# Patient Record
Sex: FEMALE | Race: BLACK OR AFRICAN AMERICAN | Employment: FULL TIME | URBAN - METROPOLITAN AREA
[De-identification: names, ages, dates, MRNs, and addresses within clinical notes are randomized per-mention and may not be internally consistent; named-entity substitution may affect disease eponyms.]

---

## 2018-08-04 ENCOUNTER — HOSPITAL ENCOUNTER (EMERGENCY)
Age: 28
Discharge: HOME HEALTH | End: 2018-08-04
Attending: EMERGENCY MEDICINE
Payer: COMMERCIAL

## 2018-08-04 VITALS
TEMPERATURE: 97.7 F | BODY MASS INDEX: 20.09 KG/M2 | WEIGHT: 128 LBS | RESPIRATION RATE: 18 BRPM | OXYGEN SATURATION: 100 % | SYSTOLIC BLOOD PRESSURE: 121 MMHG | HEART RATE: 77 BPM | DIASTOLIC BLOOD PRESSURE: 72 MMHG | HEIGHT: 67 IN

## 2018-08-04 DIAGNOSIS — R11.15 NON-INTRACTABLE CYCLICAL VOMITING WITH NAUSEA: Primary | ICD-10-CM

## 2018-08-04 LAB
ANION GAP SERPL CALC-SCNC: 13 MMOL/L (ref 3–18)
BASOPHILS # BLD: 0 K/UL (ref 0–0.06)
BASOPHILS NFR BLD: 0 % (ref 0–3)
BUN SERPL-MCNC: 12 MG/DL (ref 7–18)
BUN/CREAT SERPL: 14 (ref 12–20)
CALCIUM SERPL-MCNC: 8.9 MG/DL (ref 8.5–10.1)
CHLORIDE SERPL-SCNC: 106 MMOL/L (ref 100–108)
CO2 SERPL-SCNC: 24 MMOL/L (ref 21–32)
CREAT SERPL-MCNC: 0.86 MG/DL (ref 0.6–1.3)
DIFFERENTIAL METHOD BLD: ABNORMAL
EOSINOPHIL # BLD: 0.2 K/UL (ref 0–0.4)
EOSINOPHIL NFR BLD: 1 % (ref 0–5)
ERYTHROCYTE [DISTWIDTH] IN BLOOD BY AUTOMATED COUNT: 13 % (ref 11.6–14.5)
GLUCOSE SERPL-MCNC: 99 MG/DL (ref 74–99)
HCT VFR BLD AUTO: 40.9 % (ref 35–45)
HGB BLD-MCNC: 13.7 G/DL (ref 12–16)
LYMPHOCYTES # BLD: 2.3 K/UL (ref 0.8–3.5)
LYMPHOCYTES NFR BLD: 13 % (ref 20–51)
MCH RBC QN AUTO: 30.5 PG (ref 24–34)
MCHC RBC AUTO-ENTMCNC: 33.5 G/DL (ref 31–37)
MCV RBC AUTO: 91.1 FL (ref 74–97)
MONOCYTES # BLD: 0.4 K/UL (ref 0–1)
MONOCYTES NFR BLD: 2 % (ref 2–9)
NEUTS SEG # BLD: 14.7 K/UL (ref 1.8–8)
NEUTS SEG NFR BLD: 84 % (ref 42–75)
PLATELET # BLD AUTO: 312 K/UL (ref 135–420)
PLATELET COMMENTS,PCOM: ABNORMAL
PMV BLD AUTO: 11.1 FL (ref 9.2–11.8)
POTASSIUM SERPL-SCNC: 3.8 MMOL/L (ref 3.5–5.5)
RBC # BLD AUTO: 4.49 M/UL (ref 4.2–5.3)
RBC MORPH BLD: ABNORMAL
SODIUM SERPL-SCNC: 143 MMOL/L (ref 136–145)
WBC # BLD AUTO: 17.6 K/UL (ref 4.6–13.2)

## 2018-08-04 PROCEDURE — 96374 THER/PROPH/DIAG INJ IV PUSH: CPT

## 2018-08-04 PROCEDURE — 80048 BASIC METABOLIC PNL TOTAL CA: CPT | Performed by: EMERGENCY MEDICINE

## 2018-08-04 PROCEDURE — 74011250636 HC RX REV CODE- 250/636: Performed by: EMERGENCY MEDICINE

## 2018-08-04 PROCEDURE — 85025 COMPLETE CBC W/AUTO DIFF WBC: CPT | Performed by: EMERGENCY MEDICINE

## 2018-08-04 PROCEDURE — 99282 EMERGENCY DEPT VISIT SF MDM: CPT

## 2018-08-04 PROCEDURE — 96361 HYDRATE IV INFUSION ADD-ON: CPT

## 2018-08-04 RX ORDER — ONDANSETRON 2 MG/ML
4 INJECTION INTRAMUSCULAR; INTRAVENOUS
Status: COMPLETED | OUTPATIENT
Start: 2018-08-04 | End: 2018-08-04

## 2018-08-04 RX ORDER — ONDANSETRON 8 MG/1
8 TABLET, ORALLY DISINTEGRATING ORAL
Qty: 10 TAB | Refills: 0 | Status: SHIPPED | OUTPATIENT
Start: 2018-08-04

## 2018-08-04 RX ADMIN — ONDANSETRON 4 MG: 2 INJECTION INTRAMUSCULAR; INTRAVENOUS at 07:28

## 2018-08-04 RX ADMIN — SODIUM CHLORIDE 1000 ML: 900 INJECTION, SOLUTION INTRAVENOUS at 07:28

## 2018-08-04 NOTE — ED PROVIDER NOTES
EMERGENCY DEPARTMENT HISTORY AND PHYSICAL EXAM 
 
7:18 AM 
 
 
Date: 8/4/2018 Patient Name: Lamont Cunningham History of Presenting Illness Chief Complaint Patient presents with  Vomiting History Provided By: Patient Chief Complaint: Vomiting Duration:  10 hours Timing:  Acute Location: Gastrointestinal 
Quality: \"Dry heaving\" Severity: Moderate Modifying Factors: No modifying or aggravating factors were reported. Associated Symptoms: nausea Additional History (Context): Lamont Cunningham is a 33 y.o. female with No significant past medical history who presents with moderate acute vomiting with an onset of 10 hours ago. She says she's vomited so much that now she's only dry heaving. Associated symptoms include nausea. Patient says she hasn't been around anyone sick or taking medication. Denies diarrhea and fever. No modifying or aggravating factors were reported. PCP: None Past History Past Medical History: 
History reviewed. No pertinent past medical history. Past Surgical History: 
History reviewed. No pertinent surgical history. Family History: 
Family History Problem Relation Age of Onset  Cancer Neg Hx  Diabetes Neg Hx   
 Heart Disease Neg Hx  Hypertension Neg Hx  Stroke Neg Hx Social History: 
Social History Substance Use Topics  Smoking status: Never Smoker  Smokeless tobacco: Never Used  Alcohol use No  
 
 
Allergies: 
No Known Allergies Review of Systems Review of Systems Constitutional: Negative for fever. HENT: Negative for congestion. Respiratory: Negative for cough and shortness of breath. Cardiovascular: Negative for chest pain. Gastrointestinal: Positive for nausea and vomiting. Negative for abdominal pain and diarrhea. Musculoskeletal: Negative for back pain. Skin: Negative for rash. Neurological: Negative for light-headedness.   
All other systems reviewed and are negative. Physical Exam  
 
Visit Vitals  /86 (BP 1 Location: Left arm, BP Patient Position: At rest)  Pulse 77  Temp 97.7 °F (36.5 °C)  Resp 18  Ht 5' 7\" (1.702 m)  Wt 58.1 kg (128 lb)  LMP 07/30/2018  SpO2 100%  BMI 20.05 kg/m2 Physical Exam  
Constitutional: She is oriented to person, place, and time. She appears well-developed. Alert and oriented with moderately ill appearance. HENT:  
Head: Normocephalic. Mouth/Throat: Oropharynx is clear and moist.  
Eyes: Pupils are equal, round, and reactive to light. Neck: Normal range of motion. Cardiovascular: Normal rate and normal heart sounds. No murmur heard. Pulmonary/Chest: Effort normal. She has no wheezes. She has no rales. Abdominal: Soft. There is no tenderness. Musculoskeletal: Normal range of motion. She exhibits no edema. Neurological: She is alert and oriented to person, place, and time. Skin: Skin is warm and dry. Nursing note and vitals reviewed. Diagnostic Study Results Labs - Recent Results (from the past 12 hour(s)) METABOLIC PANEL, BASIC Collection Time: 08/04/18  7:33 AM  
Result Value Ref Range Sodium 143 136 - 145 mmol/L Potassium 3.8 3.5 - 5.5 mmol/L Chloride 106 100 - 108 mmol/L  
 CO2 24 21 - 32 mmol/L Anion gap 13 3.0 - 18 mmol/L Glucose 99 74 - 99 mg/dL BUN 12 7.0 - 18 MG/DL Creatinine 0.86 0.6 - 1.3 MG/DL  
 BUN/Creatinine ratio 14 12 - 20 GFR est AA >60 >60 ml/min/1.73m2 GFR est non-AA >60 >60 ml/min/1.73m2 Calcium 8.9 8.5 - 10.1 MG/DL  
CBC WITH AUTOMATED DIFF Collection Time: 08/04/18  7:33 AM  
Result Value Ref Range WBC 17.6 (H) 4.6 - 13.2 K/uL  
 RBC 4.49 4.20 - 5.30 M/uL  
 HGB 13.7 12.0 - 16.0 g/dL HCT 40.9 35.0 - 45.0 % MCV 91.1 74.0 - 97.0 FL  
 MCH 30.5 24.0 - 34.0 PG  
 MCHC 33.5 31.0 - 37.0 g/dL  
 RDW 13.0 11.6 - 14.5 % PLATELET 347 929 - 865 K/uL  MPV 11.1 9.2 - 11.8 FL  
 NEUTROPHILS 84 (H) 42 - 75 % LYMPHOCYTES 13 (L) 20 - 51 % MONOCYTES 2 2 - 9 % EOSINOPHILS 1 0 - 5 % BASOPHILS 0 0 - 3 %  
 ABS. NEUTROPHILS 14.7 (H) 1.8 - 8.0 K/UL  
 ABS. LYMPHOCYTES 2.3 0.8 - 3.5 K/UL  
 ABS. MONOCYTES 0.4 0 - 1.0 K/UL  
 ABS. EOSINOPHILS 0.2 0.0 - 0.4 K/UL  
 ABS. BASOPHILS 0.0 0.0 - 0.06 K/UL  
 DF MANUAL PLATELET COMMENTS ADEQUATE PLATELETS    
 RBC COMMENTS NORMOCYTIC, NORMOCHROMIC Radiologic Studies - No orders to display Medical Decision Making I am the first provider for this patient. I reviewed the vital signs, available nursing notes, past medical history, past surgical history, family history and social history. Vital Signs-Reviewed the patient's vital signs. Pulse Oximetry Analysis -  100% on room air (Normal) Records Reviewed: Nursing Notes (Time of Review: 7:18 AM) Diagnosis Clinical Impression: 1. Non-intractable cyclical vomiting with nausea Disposition: Discharge Follow-up Information Follow up With Details Comments Contact Info Via [employer] in 1 week If symptoms worsen, For Follow up [address] [place] [id number] 
[phone] [facility] EMERGENCY DEPT Go to As needed, If symptoms worsen [address] [place] [id number] [phone] Patient's Medications Start Taking ONDANSETRON (ZOFRAN ODT) 8 MG DISINTEGRATING TABLET    Take 1 Tab by mouth every eight (8) hours as needed for Nausea. Continue Taking No medications on file These Medications have changed No medications on file Stop Taking NORETHINDRONE-ETHINYL ESTRADIOL (JUNEL FE 1/20) 1 MG-20 MCG (21)/75 MG (7) TAB    Take  by mouth. ONDANSETRON (ZOFRAN ODT) 4 MG DISINTEGRATING TABLET    Take 1-2 tablets every 6-8 hours as needed for nausea and vomiting.  
 
_______________________________ Attestations: 
Scribe Attestation Olinda Briscoe acting as a scribe for and in the presence of Chary Peres MD     
August 04, 2018 at 7:18 AM 
    
Provider Attestation:     
I personally performed the services described in the documentation, reviewed the documentation, as recorded by the scribe in my presence, and it accurately and completely records my words and actions. August 04, 2018 at 7:18 AM - Chary Peres MD   
_______________________________ Pt feels better after IVF and meds, wants to go home, agrees with dispo and F/U plan.  
Chary Peres MD 
8:53 AM

## 2018-08-04 NOTE — ED NOTES
D/c instructions reviewed with [place]. No questions or concerns PIV removed States feeling better in no distress

## 2018-08-04 NOTE — ED NOTES
Patient feeling much better, IV fluids continue to infuse. Lights dimmed and call bell within reach. Will continue to monitor closely while awaiting further orders.

## 2018-08-04 NOTE — DISCHARGE INSTRUCTIONS
Learning About Cyclic Vomiting Syndrome  What is it? An adult or child who has cyclic vomiting syndrome (CVS) has repeated bouts of severe vomiting and nausea. Between the vomiting episodes, the person's health is normal. The cause of CVS isn't known, but it may be related to migraine headaches. What happens when you have CVS?  CVS causes severe nausea, vomiting, and drooling. You may not be able to walk or talk during an episode. You may look pale. You may have a fever and feel very tired and thirsty. Some people with CVS have belly pain and diarrhea. Bouts of vomiting can last for a few hours or a few days. People with this condition tend to have a typical pattern of attacks. For example, one person may have bouts of CVS 4 times a year and always in the morning. Another person may have 8 bouts a year and always in the evening. Some people with CVS have triggers that set off the vomiting. People have reported an infection, such as a cold, as a trigger. Other triggers include stress, menstrual cycles, and certain foods like chocolate or cheese. Children tend to have more triggers than adults do. How is CVS treated? Treatment is centered around easing the nausea and vomiting. The doctor may prescribe medicine. During very bad bouts of vomiting, your doctor may want you to stay in the hospital for a while. You may get fluids through a vein (IV) to prevent dehydration. Dehydration can be serious. Your body needs fluids to make enough blood. Without a good supply of blood, vital organs such as the heart and brain can't work as well as they should. When should you call for help? Call your doctor now or seek immediate medical care if:  · You have new or worse belly pain. · You have a fever. · You are vomiting. · You cannot pass stools or gas. · You have symptoms of dehydration, such as:  ¨ Dry eyes and a dry mouth. ¨ Passing only a little urine.   ¨ Feeling thirstier than usual.  Watch closely for changes in your health, and be sure to contact your doctor if you have any problems. Follow-up care is a key part of your treatment and safety. Be sure to make and go to all appointments, and call your doctor if you are having problems. It's also a good idea to know your test results and keep a list of the medicines you take. Where can you learn more? Go to http://sharron-katya.info/. Enter A707 in the search box to learn more about \"Learning About Cyclic Vomiting Syndrome. \"  Current as of: May 12, 2017  Content Version: 11.7  © 0610-8086 [employer]. Care instructions adapted under license by [employer] (which disclaims liability or warranty for this information). If you have questions about a medical condition or this instruction, always ask your healthcare professional. [address] any warranty or liability for your use of this information.

## 2018-08-04 NOTE — LETTER
NOTIFICATION RETURN TO WORK  
 
8/4/2018 8:35 AM 
 
Ms. Tonette Ahumada [address] [address] To Whom It May Concern: 
 
Tonette Ahumada is currently under the care of [id number] [facility] EMERGENCY DEPT. She will return to work on: 8/6/18 If there are questions or concerns please have the patient contact our office.  
 
 
 
Sincerely, 
 
 
 
 
 
Lana Anotine MD